# Patient Record
Sex: FEMALE | Race: WHITE | URBAN - METROPOLITAN AREA
[De-identification: names, ages, dates, MRNs, and addresses within clinical notes are randomized per-mention and may not be internally consistent; named-entity substitution may affect disease eponyms.]

---

## 2017-01-31 ENCOUNTER — OFFICE VISIT (OUTPATIENT)
Dept: ANESTHESIOLOGY | Facility: CLINIC | Age: 62
End: 2017-01-31

## 2017-01-31 VITALS
SYSTOLIC BLOOD PRESSURE: 165 MMHG | DIASTOLIC BLOOD PRESSURE: 95 MMHG | HEART RATE: 75 BPM | OXYGEN SATURATION: 98 % | HEIGHT: 64 IN | BODY MASS INDEX: 20.83 KG/M2 | WEIGHT: 122 LBS

## 2017-01-31 DIAGNOSIS — G43.719 INTRACTABLE CHRONIC MIGRAINE WITHOUT AURA AND WITHOUT STATUS MIGRAINOSUS: Primary | ICD-10-CM

## 2017-01-31 ASSESSMENT — ANXIETY QUESTIONNAIRES
7. FEELING AFRAID AS IF SOMETHING AWFUL MIGHT HAPPEN: 3 = NEARLY EVERY DAY
GAD7 TOTAL SCORE: 21
GAD7 TOTAL SCORE: 21

## 2017-01-31 ASSESSMENT — ENCOUNTER SYMPTOMS
STIFFNESS: 1
MYALGIAS: 1
PANIC: 1
DECREASED CONCENTRATION: 1
NUMBNESS: 1
DIZZINESS: 0
PARALYSIS: 0
JOINT SWELLING: 1
DISTURBANCES IN COORDINATION: 0
DEPRESSION: 1
TREMORS: 0
NECK PAIN: 1
SEIZURES: 0
NERVOUS/ANXIOUS: 1
INSOMNIA: 1
LOSS OF CONSCIOUSNESS: 0
HEADACHES: 1
MEMORY LOSS: 1
MUSCLE CRAMPS: 1
WEAKNESS: 1
ARTHRALGIAS: 1
SPEECH CHANGE: 0
BACK PAIN: 1
TINGLING: 1
MUSCLE WEAKNESS: 1

## 2017-01-31 ASSESSMENT — PAIN SCALES - GENERAL: PAINLEVEL: MODERATE PAIN (5)

## 2017-01-31 NOTE — NURSING NOTE
After visit summary given and reviewed with Millie. Complications of botox injection discussed. She states her understanding.

## 2017-01-31 NOTE — PATIENT INSTRUCTIONS
1. Do not to compress the injected areas within the first 24 hours. Call if there are any concerns including: increased head pain, fever, chills, sweats, facial weakness, swallowing difficulty, or breathing difficulty.    2. Schedule for next botox injection          To speak with a nurse, schedule/reschedule/cancel a clinic appointment, or request a medication refill call: [phone]     You can also reach us by [employer]Park: [email or web address]    For refills, please call on Monday, 1 week before your medication runs out. The doctors are not always in clinic, so this gives us time to get your prescriptions ready.  Please let us know the name of the medication you are requesting a refill of.

## 2017-01-31 NOTE — MR AVS SNAPSHOT
After Visit Summary   1/31/2017    Millie Escobedo    MRN: [id number]           Patient Information     Date Of Birth          1955        Visit Information        Provider Department      1/31/2017 10:30 AM Yfn Mahajan MD [facility]        Care Instructions    1. Do not to compress the injected areas within the first 24 hours. Call if there are any concerns including: increased head pain, fever, chills, sweats, facial weakness, swallowing difficulty, or breathing difficulty.    2. Schedule for next botox injection          To speak with a nurse, schedule/reschedule/cancel a clinic appointment, or request a medication refill call: [phone]     You can also reach us by [employer]um[employer]: [email or web address]    For refills, please call on Monday, 1 week before your medication runs out. The doctors are not always in clinic, so this gives us time to get your prescriptions ready.  Please let us know the name of the medication you are requesting a refill of.                                   Follow-ups after your visit        Follow-up notes from your care team     Return in about 3 months (around 4/30/2017).      Your next 10 appointments already scheduled     Apr 17, 2017  1:30 PM   (Arrive by 1:15 PM)   Return Visit with Yfn Mahajan MD   [facility] ([facility])    [address] [place] [id number] [phone]              Who to contact     Please call your clinic at [phone] to:    Ask questions about your health    Make or cancel appointments    Discuss your medicines    Learn about your test results    Speak to your doctor   If you have compliments or concerns about an experience at your clinic, or if you wish to file a complaint, please contact [facility] Patient Relations at [phone] or email us at  [email or web address]         Additional Information About Your Visit        Banter!hart Information     [employer] gives you secure access to your electronic health record. If you see a primary care provider, you can also send messages to your care team and make appointments. If you have questions, please call your primary care clinic.  If you do not have a primary care provider, please call [phone] and they will assist you.      [employer] is an electronic gateway that provides easy, online access to your medical records. With [employer], you can request a clinic appointment, read your test results, renew a prescription or communicate with your care team.     To access your existing account, please contact your [facility] or call [phone] for assistance.        Care EveryWhere ID     This is your Care EveryWhere ID. This could be used by other organizations to access your [facility] medical records  [id number]        Your Vitals Were     Pulse Height BMI (Body Mass Index) Pulse Oximetry          75 1.619 m (5' 3.75\") 21.11 kg/m2 98%         Blood Pressure from Last 3 Encounters:   01/31/17 165/95   05/16/16 133/81   02/26/16 128/78    Weight from Last 3 Encounters:   01/31/17 55.339 kg (122 lb)   05/16/16 62.097 kg (136 lb 14.4 oz)   02/15/16 64.728 kg (142 lb 11.2 oz)              Today, you had the following     No orders found for display       Primary Care Provider Office Phone # Fax #    Redd Ram [phone]       [address] [place] [address]        Thank you!     Thank you for choosing [facility]  for your care. Our goal is always to provide you with excellent care. Hearing back from our patients is one way we can continue to improve our services. Please take a few minutes to complete the written survey that you may receive in the mail after your visit with us. Thank you!   "           Your Updated Medication List - Protect others around you: Learn how to safely use, store and throw away your medicines at www.disposemymeds.org.          This list is accurate as of: 1/31/17 11:14 AM.  Always use your most recent med list.                   Brand Name Dispense Instructions for use    ACAI WALSH PO      Take by mouth daily       ALLEGRA PO      Take by mouth as needed for allergies       ascorbic acid 500 MG Cpcr CR capsule    vitamin C     Take 1,000 mg by mouth daily       ATIVAN PO      Take 1 mg by mouth 3 times daily as needed for anxiety       * BOTOX IJ      Inject 155 Units as directed       * Botulinum Toxin Type A 200 UNITS Solr    BOTOX    200 Units    Inject 200 Units as directed every 3 months       CALTRATE 600 PO      Take by mouth daily       CITALOPRAM HYDROBROMIDE PO      Take 40 mg by mouth daily       DENAVIR EX      Externally apply topically as needed       EPINEPHrine 0.3 MG/0.3ML injection      Inject 0.3 mg into the muscle once as needed for anaphylaxis       fluticasone 50 MCG/ACT spray    FLONASE     Spray 1 spray into both nostrils daily       GLUCOSAMINE SULFATE PO      Take 1,500 mg by mouth daily       HYDROcodone-acetaminophen 5-325 MG per tablet    NORCO     Take 1 tablet by mouth every 6 hours as needed for moderate to severe pain       HYDROXYZINE HCL PO      Take 50 mg by mouth every 6 hours as needed for itching       LACTULOSE PO      Take 10 g by mouth 2 times daily       lidocaine 5 % ointment    XYLOCAINE    50 g    Apply topically 3 times daily as needed for moderate pain Apply dime sized amount to affected areas as needed.       Magnesium 400 MG Tabs      Take by mouth daily       multivitamin, therapeutic with minerals Tabs tablet      Take 1 tablet by mouth daily       NEXIUM PO      Take 40 mg by mouth 2 times daily (before meals)       RIZATRIPTAN BENZOATE PO      Take 10 mg by mouth once as needed for migraine       TIZANIDINE HCL PO      Take  4 mg by mouth 2 times daily as needed for muscle spasms       topiramate 50 MG tablet    TOPAMAX    180 tablet    TAKE 1 TABLET TWICE A DAY       TRAZODONE HCL PO      Take 50 mg by mouth At Bedtime       TURMERIC PO      Take by mouth 2 times daily       * UNABLE TO FIND      daily MEDICATION NAME: Avacodo Oil       * UNABLE TO FIND      MEDICATION NAME: Avacodo Soy bean unsaponifiables       * UNABLE TO FIND      daily MEDICATION NAME: Tart Cherrys       * UNABLE TO FIND      Take by mouth as needed (emptying capsules into hot water) MEDICATION NAME: Hibiscus Capsules       VOLTAREN 1 % Gel topical gel   Generic drug:  diclofenac     100 g    APPLY 4 GRAMS TO KNEES OR 2 GRAMS TO HANDS FOUR TIMES A DAY USING ENCLOSED DOSING CARD       ZOFRAN PO      Take 4 mg by mouth every 8 hours as needed for nausea or vomiting       * Notice:  This list has 6 medication(s) that are the same as other medications prescribed for you. Read the directions carefully, and ask your doctor or other care provider to review them with you.
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
## 2017-02-01 ASSESSMENT — ANXIETY QUESTIONNAIRES: GAD7 TOTAL SCORE: 21

## 2017-02-22 DIAGNOSIS — M19.141 POST-TRAUMATIC OSTEOARTHRITIS OF BOTH HANDS: ICD-10-CM

## 2017-02-22 DIAGNOSIS — M19.142 POST-TRAUMATIC OSTEOARTHRITIS OF BOTH HANDS: ICD-10-CM

## 2017-05-02 DIAGNOSIS — M79.18 MYOFASCIAL PAIN: ICD-10-CM

## 2017-05-02 DIAGNOSIS — G43.711 INTRACTABLE CHRONIC MIGRAINE WITHOUT AURA AND WITH STATUS MIGRAINOSUS: ICD-10-CM

## 2017-05-02 RX ORDER — TOPIRAMATE 50 MG/1
50 TABLET, FILM COATED ORAL 2 TIMES DAILY
Qty: 180 TABLET | Refills: 0 | Status: SHIPPED | OUTPATIENT
Start: 2017-05-02 | End: 2017-08-04

## 2017-06-13 DIAGNOSIS — M79.18 MYOFASCIAL PAIN: ICD-10-CM

## 2017-06-13 RX ORDER — LIDOCAINE 50 MG/G
OINTMENT TOPICAL 3 TIMES DAILY PRN
Qty: 50 G | Refills: 5 | Status: SHIPPED | OUTPATIENT
Start: 2017-06-13

## 2017-06-13 NOTE — TELEPHONE ENCOUNTER
Refill request for lidocaine ointment from patient.    Verbal order from mary Traylor to refill. Prescription sent to [facility] as requested by patient.

## 2017-08-03 DIAGNOSIS — G43.711 INTRACTABLE CHRONIC MIGRAINE WITHOUT AURA AND WITH STATUS MIGRAINOSUS: ICD-10-CM

## 2017-08-03 DIAGNOSIS — M79.18 MYOFASCIAL PAIN: ICD-10-CM

## 2017-08-04 DIAGNOSIS — M79.18 MYOFASCIAL PAIN: ICD-10-CM

## 2017-08-04 DIAGNOSIS — G43.711 INTRACTABLE CHRONIC MIGRAINE WITHOUT AURA AND WITH STATUS MIGRAINOSUS: ICD-10-CM

## 2017-08-04 RX ORDER — TOPIRAMATE 50 MG/1
TABLET, FILM COATED ORAL
Qty: 180 TABLET | Status: CANCELLED | OUTPATIENT
Start: 2017-08-04

## 2017-08-04 RX ORDER — TOPIRAMATE 50 MG/1
50 TABLET, FILM COATED ORAL 2 TIMES DAILY
Qty: 180 TABLET | Refills: 3 | Status: SHIPPED | OUTPATIENT
Start: 2017-08-04

## 2017-08-04 NOTE — TELEPHONE ENCOUNTER
Patient notified to make a return clinic appt for future refills of topamax. She has not followed up since January. Her original prescribing doctor, Dr. Mahajan is no longer with this clinic. Millie states her understanding.    Topamax has been refilled for the next 4 months.

## 2017-12-31 ENCOUNTER — HEALTH MAINTENANCE LETTER (OUTPATIENT)
Age: 62
End: 2017-12-31

## 2020-03-10 ENCOUNTER — HEALTH MAINTENANCE LETTER (OUTPATIENT)
Age: 65
End: 2020-03-10

## 2020-12-27 ENCOUNTER — HEALTH MAINTENANCE LETTER (OUTPATIENT)
Age: 65
End: 2020-12-27

## 2021-10-04 ENCOUNTER — HEALTH MAINTENANCE LETTER (OUTPATIENT)
Age: 66
End: 2021-10-04

## 2022-01-23 ENCOUNTER — HEALTH MAINTENANCE LETTER (OUTPATIENT)
Age: 67
End: 2022-01-23

## 2022-03-20 ENCOUNTER — HEALTH MAINTENANCE LETTER (OUTPATIENT)
Age: 67
End: 2022-03-20

## 2022-09-11 ENCOUNTER — HEALTH MAINTENANCE LETTER (OUTPATIENT)
Age: 67
End: 2022-09-11

## 2023-04-30 ENCOUNTER — HEALTH MAINTENANCE LETTER (OUTPATIENT)
Age: 68
End: 2023-04-30